# Patient Record
Sex: FEMALE | Race: WHITE
[De-identification: names, ages, dates, MRNs, and addresses within clinical notes are randomized per-mention and may not be internally consistent; named-entity substitution may affect disease eponyms.]

---

## 2020-12-16 ENCOUNTER — HOSPITAL ENCOUNTER (OUTPATIENT)
Dept: HOSPITAL 76 - DI | Age: 35
Discharge: HOME | End: 2020-12-16
Attending: FAMILY MEDICINE
Payer: COMMERCIAL

## 2020-12-16 DIAGNOSIS — N63.23: Primary | ICD-10-CM

## 2020-12-18 NOTE — MAMMOGRAPHY REPORT
BILATERAL DIGITAL DIAGNOSTIC MAMMOGRAM 3D/2D: 12/16/2020

CLINICAL: Palpable left breast lump.  

 

No prior exams were available for comparison.  The tissue of both breasts is extremely dense, which l
owers the sensitivity of mammography.  

 

 

There is a mass with an indistinct margin in the left breast at 7 o'clock posterior depth.  

No other significant masses, calcifications, or other findings are seen in either breast.  

 

IMPRESSION: INCOMPLETE: NEEDS ADDITIONAL IMAGING EVALUATION

The mass in the left breast is indeterminate.  An ultrasound is recommended.  

 

 

 

This exam was interpreted at Station ID: [id number].  

 

NOTE: For mammograms, a report in lay terms will be sent to the patient. Approximately 15% of breast 
malignancies will not be visualized mammographically. In the management of a palpable breast mass, a 
negative mammogram must not discourage biopsy of a clinically suspicious lesion.

 

Electronically Signed By: José Gambino M.D.          

jr/:12/17/2020 12:19:43  

 

 

 

ACR BI-RADS Category 0: Incomplete 3340F

PARENCHYMAL PATTERN: (VD) - The breast(s) demonstrate(s) extremely dense parenchyma, limiting the sen
sitivity of mammography.

BI-RADS CATEGORY: (0) - 0

Ultrasound

[id number]

Immediate follow-up

LATERALITY: (B)

## 2020-12-22 ENCOUNTER — HOSPITAL ENCOUNTER (OUTPATIENT)
Dept: HOSPITAL 76 - DI | Age: 35
Discharge: HOME | End: 2020-12-22
Attending: FAMILY MEDICINE
Payer: COMMERCIAL

## 2020-12-22 DIAGNOSIS — N63.24: Primary | ICD-10-CM

## 2020-12-23 NOTE — ULTRASOUND REPORT
LIMITED ULTRASOUND OF LEFT BREAST AND AXILLA: 12/22/2020

CLINICAL: Palpable left breast lump.  

 

Comparison is made to exams dated:  12/16/2020 mammogram - [facility], 10/15/2020 u
ltrasound, 5/31/2013 ultrasound, and 5/20/2013 ultrasound biopsy - [facility].  

 Color flow and real-time ultrasound of the left breast axilla were performed.  Gray scale images of 
the real-time examination were reviewed.  

 

There is a 2 cm x 1 cm x 2 cm oval mass in the left breast at 7 o'clock posterior depth 7 cm from the
 nipple. There are a few angular margins.  This mass is hypoechoic and correlates as palpated, with m
ammography findings, and area of clinical concern.  Color flow imaging demonstrates that there is an 
adjacent vascularity.  

No significant abnormalities were seen sonographically in the left axilla.  

 

IMPRESSION: SUSPICIOUS OF MALIGNANCY 

The 2 cm x 1 cm x 2 cm oval mass in the left breast most likely is a fibroadenoma but is at a low yvette
picion for malignancy.  An ultrasound guided biopsy is recommended. 

 

Findings and recommendations were discussed with the patient during today's examination. 

 

 

This exam was interpreted at Station ID: [id number].  

Electronically Signed By: Edil Russ M.D.  

aty/:12/22/2020 13:10:00  

 

 

 

Ultrasound BI-RADS: 4a Low suspicion for malignancy

BI-RADS CATEGORY: (4a) - Low Susp

None

[id number]

Immediate follow-up

LATERALITY: ()

## 2021-01-07 ENCOUNTER — HOSPITAL ENCOUNTER (OUTPATIENT)
Dept: HOSPITAL 76 - DI | Age: 36
Discharge: HOME | End: 2021-01-07
Attending: FAMILY MEDICINE
Payer: COMMERCIAL

## 2021-01-07 DIAGNOSIS — D24.2: Primary | ICD-10-CM

## 2021-01-07 PROCEDURE — 19083 BX BREAST 1ST LESION US IMAG: CPT

## 2021-01-08 NOTE — MAMMOGRAPHY REPORT
UNILATERAL LEFT DIGITAL DIAGNOSTIC MAMMOGRAM 3D/2D: 1/7/2021

 

CLINICAL: Post left breast ultrasound biopsy, clip placment imaging.  

 

Comparison is made to exams dated:  12/22/2020 ultrasound, 12/16/2020 mammogram - [facility], and 10/15/2020 ultrasound - [facility].  The tissue of left breast is extre
gucci dense, which lowers the sensitivity of mammography.  

 

There is a marker clip in the appropriate position in the left breast at 7 o'clock posterior depth 7 
cm from the nipple.  

 

 

IMPRESSION: POST PROCEDURE MAMMOGRAM FOR MARKER PLACEMENT

There was a successful marker clip placement in the left breast posterior depth.  

 

 

 

This exam was interpreted at Station ID: [id number].  

 

NOTE: For mammograms, a report in lay terms will be sent to the patient. Approximately 15% of breast 
malignancies will not be visualized mammographically. In the management of a palpable breast mass, a 
negative mammogram must not discourage biopsy of a clinically suspicious lesion.

 

Electronically Signed By: José Gambino M.D., jr/curt:1/7/2021 15:14:36  

 

 

 

ACR BI-RADS Category Post-procedure mammogram for marker placement

PARENCHYMAL PATTERN: (VD) - The breast(s) demonstrate(s) extremely dense parenchyma, limiting the sen
sitivity of mammography.

BI-RADS CATEGORY: () - 

RECOMMENDATION: (ADDMAM) - Recommend additional mammographic views.

 

recall n/a

LATERALITY: (B)

## 2021-01-13 NOTE — ULTRASOUND REPORT
ULTRASOUND GUIDED BIOPSY LEFT BREAST USING VACUUM DEVICE WITH MARKING DEVICE INSERTED AND POST DIGITA
L MAMMOGRAPHIC [id number]

CLINICAL: Left breast mass.  

 

PATIENT CONSENT: Risks (minor bleeding, infection, vasovagal reaction and repeat procedure), benefits
 and alternatives were explained to the patient and written informed consent was obtained.  

 

Correlation is made to exams dated:  2020 ultrasound, 2020 mammogram - [facility], 10/15/2020 ultrasound, 2013 ultrasound, and 2013 ultrasound biopsy - [facility]. 

 

An ultrasound guided biopsy using real-time ultrasound was performed for the 2 cm x 1 cm x 2 cm mass 
located in the left breast at 7 o'clock posterior depth 7 cm from the nipple.  This was described on 
the previous ultrasound report.  The skin was prepped in the usual manner.  Local anesthetic was admi
nistered to the access site.  A skin nick was made in the breast.  The abnormality was approached fro
m the lateral aspect.  A 12 gauge biopsy needle was placed adjacent to the abnormality under ultrasou
nd guidance.  Once the needle was documented to be in the correct location, three specimens were obta
ined using the [employer] system.  A clip was inserted into the biopsy cavity.  A skin closure stri
p and a sterile dressing were applied to the access site.  Post procedure digital mammographic imagin
g demonstrates the location device at the targeted area.  The specimens were sent to the laboratory f
or pathological analysis.  

 

 

IMPRESSION: ULTRASOUND GUIDED BIOPSY BENIGN 

Ultrasound guided biopsy of the 2 cm x 1 cm x 2 cm mass in the left breast at 7 o'clock posterior dep
th 7 cm from the nipple was successful with no apparent post procedure complications.  

 

Pathology indicates benign fibroadenoma (FA).  Pathology results are concordant with imaging findings
.  

 

A 5 year screening mammogram at age 40 is recommended.   

 

This exam was interpreted at Station ID: [id number].  

 

José Keller M.D., jr,slc/:2021 17:42:20

BI-RADS CATEGORY: () - 

Mammogram

[id number]

5 year screening

LATERALITY: (B)